# Patient Record
Sex: MALE | Race: WHITE | URBAN - METROPOLITAN AREA
[De-identification: names, ages, dates, MRNs, and addresses within clinical notes are randomized per-mention and may not be internally consistent; named-entity substitution may affect disease eponyms.]

---

## 2023-06-13 ENCOUNTER — OFFICE VISIT (OUTPATIENT)
Facility: LOCATION | Age: 38
End: 2023-06-13
Payer: OTHER GOVERNMENT

## 2023-06-13 DIAGNOSIS — H18.611 KERATOCONUS, STABLE, RIGHT EYE: ICD-10-CM

## 2023-06-13 DIAGNOSIS — H16.223 KERATOCONJUNCT SICCA, NOT SPECIFIED AS SJOGREN'S, BILATERAL: ICD-10-CM

## 2023-06-13 DIAGNOSIS — H04.123 DRY EYE SYNDROME OF BILATERAL LACRIMAL GLANDS: ICD-10-CM

## 2023-06-13 DIAGNOSIS — H18.612 KERATOCONUS, STABLE, LEFT EYE: Primary | ICD-10-CM

## 2023-06-13 PROCEDURE — 99213 OFFICE O/P EST LOW 20 MIN: CPT | Performed by: OPTOMETRIST

## 2023-06-13 PROCEDURE — 92025 CPTRIZED CORNEAL TOPOGRAPHY: CPT | Performed by: OPTOMETRIST

## 2023-06-13 PROCEDURE — 76514 ECHO EXAM OF EYE THICKNESS: CPT | Performed by: OPTOMETRIST

## 2023-06-13 ASSESSMENT — KERATOMETRY: OD: 7.41

## 2023-06-13 ASSESSMENT — INTRAOCULAR PRESSURE
OD: 14
OS: 16

## 2023-06-13 NOTE — IMPRESSION/PLAN
Impression: Stable keratoconus, 1 month s/p Avedro CXL OS doing well. (-) BSCL, No irregular epithelium, demarcation line as expected Topography, Pentacam, and Pachymetery tests performed and reviewed with patient today. Recommend patient to start sacral lens trail. Patient could not get due to insurance process and is still considering. Patient recently got Rx glasses, new Rx in chart. Patient to wear 100% UV A and B sunglasses while outdoors x 6 months post op. Plan: Patient to continue non-preserved Refresh QID OU. 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 Check VA (OD, OS, OU), Auto and Manual Ks, MR,  IOP and exam. CXL testing OU. RTC PRN decrease VA, increase pain, redness, discharge, photophobia, flashes/floaters or other vision problems.

## 2023-06-13 NOTE — IMPRESSION/PLAN
Impression: Dry eye syndrome of bilateral lacrimal glands: H04.123. Lyudmila plugs x1 LL present, patient c/o of irritations in corner of eyes. Recommended patient to use lasatacaft nightly for allergies. Plan: Patient to start the use of Lasatacaft QHS OU and AT's QID OU.

## 2023-06-13 NOTE — IMPRESSION/PLAN
Impression: Examination revealed keratoconus. In today exam, OD appears stable. Plan: No CXL OD indicated at this time. Continue to monitor.

## 2023-06-13 NOTE — IMPRESSION/PLAN
Impression: Keratoconjunct sicca, not specified as Sjogren's, bilateral: L44.986. Plan: Refer to plan above.